# Patient Record
Sex: FEMALE | Race: BLACK OR AFRICAN AMERICAN | NOT HISPANIC OR LATINO | URBAN - METROPOLITAN AREA
[De-identification: names, ages, dates, MRNs, and addresses within clinical notes are randomized per-mention and may not be internally consistent; named-entity substitution may affect disease eponyms.]

---

## 2018-04-09 ENCOUNTER — OUTPATIENT (OUTPATIENT)
Dept: OUTPATIENT SERVICES | Facility: HOSPITAL | Age: 33
LOS: 1 days | Discharge: HOME | End: 2018-04-09

## 2018-04-10 DIAGNOSIS — Z02.1 ENCOUNTER FOR PRE-EMPLOYMENT EXAMINATION: ICD-10-CM

## 2019-02-04 ENCOUNTER — EMERGENCY (EMERGENCY)
Facility: HOSPITAL | Age: 34
LOS: 0 days | Discharge: HOME | End: 2019-02-05
Attending: EMERGENCY MEDICINE | Admitting: EMERGENCY MEDICINE

## 2019-02-04 VITALS
HEART RATE: 119 BPM | DIASTOLIC BLOOD PRESSURE: 85 MMHG | OXYGEN SATURATION: 98 % | RESPIRATION RATE: 20 BRPM | TEMPERATURE: 102 F | SYSTOLIC BLOOD PRESSURE: 157 MMHG

## 2019-02-04 DIAGNOSIS — Z91.013 ALLERGY TO SEAFOOD: ICD-10-CM

## 2019-02-04 DIAGNOSIS — R50.9 FEVER, UNSPECIFIED: ICD-10-CM

## 2019-02-04 DIAGNOSIS — N39.0 URINARY TRACT INFECTION, SITE NOT SPECIFIED: ICD-10-CM

## 2019-02-04 DIAGNOSIS — J11.1 INFLUENZA DUE TO UNIDENTIFIED INFLUENZA VIRUS WITH OTHER RESPIRATORY MANIFESTATIONS: ICD-10-CM

## 2019-02-04 DIAGNOSIS — J18.9 PNEUMONIA, UNSPECIFIED ORGANISM: ICD-10-CM

## 2019-02-04 NOTE — ED ADULT TRIAGE NOTE - CHIEF COMPLAINT QUOTE
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

## 2019-02-05 VITALS — TEMPERATURE: 99 F

## 2019-02-05 LAB
ALBUMIN SERPL ELPH-MCNC: 4.4 G/DL — SIGNIFICANT CHANGE UP (ref 3.5–5.2)
ALP SERPL-CCNC: 70 U/L — SIGNIFICANT CHANGE UP (ref 30–115)
ALT FLD-CCNC: 17 U/L — SIGNIFICANT CHANGE UP (ref 0–41)
ANION GAP SERPL CALC-SCNC: 17 MMOL/L — HIGH (ref 7–14)
APPEARANCE UR: ABNORMAL
APTT BLD: 32.4 SEC — SIGNIFICANT CHANGE UP (ref 27–39.2)
AST SERPL-CCNC: 17 U/L — SIGNIFICANT CHANGE UP (ref 0–41)
BACTERIA # UR AUTO: ABNORMAL /HPF
BASOPHILS # BLD AUTO: 0.05 K/UL — SIGNIFICANT CHANGE UP (ref 0–0.2)
BASOPHILS NFR BLD AUTO: 0.6 % — SIGNIFICANT CHANGE UP (ref 0–1)
BILIRUB SERPL-MCNC: 0.3 MG/DL — SIGNIFICANT CHANGE UP (ref 0.2–1.2)
BILIRUB UR-MCNC: NEGATIVE — SIGNIFICANT CHANGE UP
BUN SERPL-MCNC: 8 MG/DL — LOW (ref 10–20)
CALCIUM SERPL-MCNC: 9.1 MG/DL — SIGNIFICANT CHANGE UP (ref 8.5–10.1)
CHLORIDE SERPL-SCNC: 96 MMOL/L — LOW (ref 98–110)
CO2 SERPL-SCNC: 25 MMOL/L — SIGNIFICANT CHANGE UP (ref 17–32)
COLOR SPEC: YELLOW — SIGNIFICANT CHANGE UP
CREAT SERPL-MCNC: 0.8 MG/DL — SIGNIFICANT CHANGE UP (ref 0.7–1.5)
DIFF PNL FLD: NEGATIVE — SIGNIFICANT CHANGE UP
EOSINOPHIL # BLD AUTO: 0.1 K/UL — SIGNIFICANT CHANGE UP (ref 0–0.7)
EOSINOPHIL NFR BLD AUTO: 1.2 % — SIGNIFICANT CHANGE UP (ref 0–8)
GLUCOSE SERPL-MCNC: 120 MG/DL — HIGH (ref 70–99)
GLUCOSE UR QL: NEGATIVE MG/DL — SIGNIFICANT CHANGE UP
HCG SERPL QL: NEGATIVE — SIGNIFICANT CHANGE UP
HCT VFR BLD CALC: 34.4 % — LOW (ref 37–47)
HGB BLD-MCNC: 10.8 G/DL — LOW (ref 12–16)
IMM GRANULOCYTES NFR BLD AUTO: 0.6 % — HIGH (ref 0.1–0.3)
INR BLD: 1.18 RATIO — SIGNIFICANT CHANGE UP (ref 0.65–1.3)
KETONES UR-MCNC: NEGATIVE — SIGNIFICANT CHANGE UP
LACTATE SERPL-SCNC: 1.4 MMOL/L — SIGNIFICANT CHANGE UP (ref 0.5–2.2)
LEUKOCYTE ESTERASE UR-ACNC: NEGATIVE — SIGNIFICANT CHANGE UP
LYMPHOCYTES # BLD AUTO: 0.86 K/UL — LOW (ref 1.2–3.4)
LYMPHOCYTES # BLD AUTO: 10.6 % — LOW (ref 20.5–51.1)
MCHC RBC-ENTMCNC: 21.1 PG — LOW (ref 27–31)
MCHC RBC-ENTMCNC: 31.4 G/DL — LOW (ref 32–37)
MCV RBC AUTO: 67.1 FL — LOW (ref 81–99)
MONOCYTES # BLD AUTO: 1.09 K/UL — HIGH (ref 0.1–0.6)
MONOCYTES NFR BLD AUTO: 13.4 % — HIGH (ref 1.7–9.3)
NEUTROPHILS # BLD AUTO: 6 K/UL — SIGNIFICANT CHANGE UP (ref 1.4–6.5)
NEUTROPHILS NFR BLD AUTO: 73.6 % — SIGNIFICANT CHANGE UP (ref 42.2–75.2)
NITRITE UR-MCNC: POSITIVE
NRBC # BLD: 0 /100 WBCS — SIGNIFICANT CHANGE UP (ref 0–0)
PH UR: 7 — SIGNIFICANT CHANGE UP (ref 5–8)
PLATELET # BLD AUTO: 266 K/UL — SIGNIFICANT CHANGE UP (ref 130–400)
POTASSIUM SERPL-MCNC: 3.8 MMOL/L — SIGNIFICANT CHANGE UP (ref 3.5–5)
POTASSIUM SERPL-SCNC: 3.8 MMOL/L — SIGNIFICANT CHANGE UP (ref 3.5–5)
PROT SERPL-MCNC: 7.4 G/DL — SIGNIFICANT CHANGE UP (ref 6–8)
PROT UR-MCNC: NEGATIVE MG/DL — SIGNIFICANT CHANGE UP
PROTHROM AB SERPL-ACNC: 13.5 SEC — HIGH (ref 9.95–12.87)
RBC # BLD: 5.13 M/UL — SIGNIFICANT CHANGE UP (ref 4.2–5.4)
RBC # FLD: 15.1 % — HIGH (ref 11.5–14.5)
SODIUM SERPL-SCNC: 138 MMOL/L — SIGNIFICANT CHANGE UP (ref 135–146)
SP GR SPEC: 1.01 — SIGNIFICANT CHANGE UP (ref 1.01–1.03)
UROBILINOGEN FLD QL: 1 MG/DL (ref 0.2–0.2)
WBC # BLD: 8.15 K/UL — SIGNIFICANT CHANGE UP (ref 4.8–10.8)
WBC # FLD AUTO: 8.15 K/UL — SIGNIFICANT CHANGE UP (ref 4.8–10.8)

## 2019-02-05 RX ORDER — SODIUM CHLORIDE 9 MG/ML
3000 INJECTION INTRAMUSCULAR; INTRAVENOUS; SUBCUTANEOUS ONCE
Qty: 0 | Refills: 0 | Status: COMPLETED | OUTPATIENT
Start: 2019-02-05 | End: 2019-02-05

## 2019-02-05 RX ORDER — AZITHROMYCIN 500 MG/1
500 TABLET, FILM COATED ORAL ONCE
Qty: 0 | Refills: 0 | Status: COMPLETED | OUTPATIENT
Start: 2019-02-05 | End: 2019-02-05

## 2019-02-05 RX ORDER — CIPROFLOXACIN LACTATE 400MG/40ML
1 VIAL (ML) INTRAVENOUS
Qty: 5 | Refills: 0 | OUTPATIENT
Start: 2019-02-05 | End: 2019-02-09

## 2019-02-05 RX ORDER — CEFTRIAXONE 500 MG/1
1 INJECTION, POWDER, FOR SOLUTION INTRAMUSCULAR; INTRAVENOUS ONCE
Qty: 0 | Refills: 0 | Status: COMPLETED | OUTPATIENT
Start: 2019-02-05 | End: 2019-02-05

## 2019-02-05 RX ORDER — KETOROLAC TROMETHAMINE 30 MG/ML
15 SYRINGE (ML) INJECTION ONCE
Qty: 0 | Refills: 0 | Status: DISCONTINUED | OUTPATIENT
Start: 2019-02-05 | End: 2019-02-05

## 2019-02-05 RX ADMIN — SODIUM CHLORIDE 3000 MILLILITER(S): 9 INJECTION INTRAMUSCULAR; INTRAVENOUS; SUBCUTANEOUS at 01:06

## 2019-02-05 RX ADMIN — Medication 15 MILLIGRAM(S): at 01:06

## 2019-02-05 RX ADMIN — AZITHROMYCIN 500 MILLIGRAM(S): 500 TABLET, FILM COATED ORAL at 04:00

## 2019-02-05 RX ADMIN — CEFTRIAXONE 100 GRAM(S): 500 INJECTION, POWDER, FOR SOLUTION INTRAMUSCULAR; INTRAVENOUS at 03:33

## 2019-02-05 NOTE — ED PROVIDER NOTE - CARE PLAN
Principal Discharge DX:	Flu Principal Discharge DX:	Flu  Secondary Diagnosis:	Pneumonia  Secondary Diagnosis:	UTI (urinary tract infection)

## 2019-02-05 NOTE — ED PROVIDER NOTE - PROGRESS NOTE DETAILS
I reassessed the patient, reviewed vital signs. <2 sec capillary refill, 2+ radial pulse, skin warm and dry with no tenting.

## 2019-02-05 NOTE — ED PROVIDER NOTE - PHYSICAL EXAMINATION
Febrile, tachycardic, hemodynamically stable, saturating well on RA  NAD, well appearing  Head NCAT  EOMI grossly, anicteric  Neck supple, full ROM  MMM  RRR, nml S1/S2, no m/r/g  Lungs CTAB, no w/r/r  Abd soft, NT, ND, nml BS, no rebound or guarding  AAO, CN's 3-12 grossly intact  WARE spontaneously, no leg cyanosis or edema  Skin warm, well perfused, no rashes or hives

## 2019-02-05 NOTE — ED PROVIDER NOTE - MEDICAL DECISION MAKING DETAILS
No meningeal signs, signs of endocarditis or cellulitis, or e/o UTI or PNA on testing. No meningeal signs, signs of endocarditis or cellulitis. Noted cough and concern for PNA on CXR as well as positive UA for UTI though no symptoms, no signs on exam of pyelo. Given ceftriaxone/azith as well as fluids with resolution of symptoms. Pt well appearing, well hydrated, hemodynamically stable. Discharged on levaquin for both UTI/PNA, tamiflu, need for hydration, further symptomatic care, return precautions.

## 2019-02-10 LAB
CULTURE RESULTS: SIGNIFICANT CHANGE UP
CULTURE RESULTS: SIGNIFICANT CHANGE UP
SPECIMEN SOURCE: SIGNIFICANT CHANGE UP
SPECIMEN SOURCE: SIGNIFICANT CHANGE UP

## 2019-07-23 ENCOUNTER — EMERGENCY (EMERGENCY)
Facility: HOSPITAL | Age: 34
LOS: 0 days | Discharge: HOME | End: 2019-07-23
Admitting: EMERGENCY MEDICINE
Payer: OTHER MISCELLANEOUS

## 2019-07-23 VITALS
HEART RATE: 83 BPM | TEMPERATURE: 96 F | SYSTOLIC BLOOD PRESSURE: 139 MMHG | RESPIRATION RATE: 16 BRPM | OXYGEN SATURATION: 97 % | DIASTOLIC BLOOD PRESSURE: 90 MMHG

## 2019-07-23 DIAGNOSIS — Z79.899 OTHER LONG TERM (CURRENT) DRUG THERAPY: ICD-10-CM
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
## 2019-07-23 DIAGNOSIS — M25.532 PAIN IN LEFT WRIST: ICD-10-CM

## 2019-07-23 PROCEDURE — 99283 EMERGENCY DEPT VISIT LOW MDM: CPT | Mod: 25

## 2019-07-23 PROCEDURE — 29125 APPL SHORT ARM SPLINT STATIC: CPT

## 2019-07-23 PROCEDURE — 99053 MED SERV 10PM-8AM 24 HR FAC: CPT

## 2019-07-23 PROCEDURE — 73110 X-RAY EXAM OF WRIST: CPT | Mod: 26,LT

## 2019-07-23 NOTE — ED PROVIDER NOTE - OBJECTIVE STATEMENT
34 yo female with no significant pmh presents to the ED c/o left wrist pain s/p twisting her wrist yesterday while at work. Patient states she is a  and was helping lifting a pain into a hoist and twister her wrist. No additional injury. No head trauma. Denies fever, UE weakness or paresthesias. no elbow, hand or shoulder pain.

## 2019-07-23 NOTE — ED ADULT NURSE NOTE - CHIEF COMPLAINT
Atorvastatin sent to [facility] pharmacy. Not sure what form he wants for heart failure. Need more details from pt.    The patient is a 33y Female complaining of wrist pain/injury.

## 2019-07-23 NOTE — ED PROVIDER NOTE - NS ED ROS FT
Constitutional: no fever, chills   Cardiovascular: no chest pain, no sob  Respiratory: no cough, no shortness of breath  Gastrointestinal: no nausea, vomiting  Musculoskeletal: see hpi.  no neck pain, no back pain, no joint pain.    Integumentary: no rash or skin changes. no edema  Neurological: no headache, no dizziness, no visual changes, no UE/LE weakness or paresthesias.

## 2019-07-23 NOTE — ED PROVIDER NOTE - CARE PROVIDER_API CALL
Ac Enriquez)  Orthopaedic Surgery  [address] [place] [address]  Phone: [phone]  Fax: [phone]  Follow Up Time:

## 2019-07-23 NOTE — ED PROVIDER NOTE - PHYSICAL EXAMINATION
GENERAL:  well appearing, non-toxic female in no acute distress  SKIN: skin warm, pink and dry. MMM. + mild swelling to radial aspect of left wrist. no erythema, increased warmth, rash, abrasions/lacerations. cap refill < 2 sec   MSK: + TTP radial aspect of left wrist but with FROM. no snuffbox tenderness. no TTP metacarpals or elbow with FROM. No erythema, cyanosis. radial pulses equal and intact bilaterally  NEURO: A+Ox3. Sensation of bilateral UE equal and intact bilaterally.

## 2019-09-19 ENCOUNTER — EMERGENCY (EMERGENCY)
Facility: HOSPITAL | Age: 34
LOS: 0 days | Discharge: HOME | End: 2019-09-19
Admitting: EMERGENCY MEDICINE
Payer: COMMERCIAL

## 2019-09-19 VITALS
TEMPERATURE: 98 F | SYSTOLIC BLOOD PRESSURE: 119 MMHG | HEART RATE: 86 BPM | OXYGEN SATURATION: 97 % | DIASTOLIC BLOOD PRESSURE: 82 MMHG | RESPIRATION RATE: 18 BRPM

## 2019-09-19 DIAGNOSIS — R05 COUGH: ICD-10-CM

## 2019-09-19 PROCEDURE — 99283 EMERGENCY DEPT VISIT LOW MDM: CPT

## 2019-09-19 PROCEDURE — 71046 X-RAY EXAM CHEST 2 VIEWS: CPT | Mod: 26

## 2019-09-19 RX ORDER — AZITHROMYCIN 500 MG/1
1 TABLET, FILM COATED ORAL
Qty: 4 | Refills: 0
Start: 2019-09-19 | End: 2019-09-21

## 2019-09-19 NOTE — ED PROVIDER NOTE - OBJECTIVE STATEMENT
34 year old F c/o broken L 4th finger. Pt sts she noticed the nail was broken yesterday at work but denies any known injuries/trauma. No pain/paresthesias, decreased sensation. Pt also notes dry cough x 1 week. Cough is worse at night. + rhinorrhea, sore throat. Sts multiple sick contacts at work. No fever/chills, nausea, vomiting, chest pain, sob, recent travel.

## 2019-09-19 NOTE — ED PROVIDER NOTE - PATIENT PORTAL LINK FT
You can access the FollowMyHealth Patient Portal offered by [facility] by registering at the following website: [email or web address]. By joining [employer]’s FollowMyHealth portal, you will also be able to view your health information using other applications (apps) compatible with our system.

## 2019-09-19 NOTE — ED PROVIDER NOTE - PHYSICAL EXAMINATION
CONSTITUTIONAL: Well-appearing; well-nourished; in no apparent distress.   EYES: PERRL; EOM intact.   ENT: normal nose; no rhinorrhea; normal pharynx with no tonsillar hypertrophy.   NECK: Supple; non-tender; no cervical lymphadenopathy.   CARDIOVASCULAR: Normal S1, S2; no murmurs, rubs, or gallops.   RESPIRATORY: Normal chest excursion with respiration; breath sounds clear and equal bilaterally; no wheezes, rhonchi, or rales.  MS: No evidence of trauma or deformity. Normal ROM in all four extremities; non-tender to palpation; distal pulses are normal.   SKIN: + left 4th finger nail partially removed. Nail bed intact. No other injuries, cellulitic changes, discharge  NEURO/PSYCH: A & O x 4; grossly unremarkable. Sensation intact

## 2019-09-20 PROBLEM — Z78.9 OTHER SPECIFIED HEALTH STATUS: Chronic | Status: ACTIVE | Noted: 2019-07-23

## 2021-08-13 ENCOUNTER — EMERGENCY (EMERGENCY)
Facility: HOSPITAL | Age: 36
LOS: 0 days | Discharge: HOME | End: 2021-08-13
Attending: EMERGENCY MEDICINE | Admitting: EMERGENCY MEDICINE
Payer: COMMERCIAL

## 2021-08-13 VITALS
SYSTOLIC BLOOD PRESSURE: 104 MMHG | DIASTOLIC BLOOD PRESSURE: 51 MMHG | WEIGHT: 293 LBS | TEMPERATURE: 99 F | RESPIRATION RATE: 20 BRPM | HEART RATE: 100 BPM | HEIGHT: 64 IN

## 2021-08-13 DIAGNOSIS — M79.10 MYALGIA, UNSPECIFIED SITE: ICD-10-CM

## 2021-08-13 DIAGNOSIS — Z20.822 CONTACT WITH AND (SUSPECTED) EXPOSURE TO COVID-19: ICD-10-CM

## 2021-08-13 DIAGNOSIS — E66.9 OBESITY, UNSPECIFIED: ICD-10-CM

## 2021-08-13 DIAGNOSIS — Z91.013 ALLERGY TO SEAFOOD: ICD-10-CM

## 2021-08-13 DIAGNOSIS — J02.9 ACUTE PHARYNGITIS, UNSPECIFIED: ICD-10-CM

## 2021-08-13 DIAGNOSIS — Z86.16 PERSONAL HISTORY OF COVID-19: ICD-10-CM

## 2021-08-13 DIAGNOSIS — R05 COUGH: ICD-10-CM

## 2021-08-13 DIAGNOSIS — R50.9 FEVER, UNSPECIFIED: ICD-10-CM

## 2021-08-13 DIAGNOSIS — Z79.899 OTHER LONG TERM (CURRENT) DRUG THERAPY: ICD-10-CM

## 2021-08-13 DIAGNOSIS — R09.81 NASAL CONGESTION: ICD-10-CM

## 2021-08-13 LAB — SARS-COV-2 RNA SPEC QL NAA+PROBE: SIGNIFICANT CHANGE UP

## 2021-08-13 PROCEDURE — 99284 EMERGENCY DEPT VISIT MOD MDM: CPT

## 2021-08-13 RX ORDER — IBUPROFEN 200 MG
800 TABLET ORAL ONCE
Refills: 0 | Status: COMPLETED | OUTPATIENT
Start: 2021-08-13 | End: 2021-08-13

## 2021-08-13 RX ADMIN — Medication 800 MILLIGRAM(S): at 21:39

## 2021-08-13 NOTE — ED PROVIDER NOTE - NSFOLLOWUPINSTRUCTIONS_ED_ALL_ED_FT
Follow up with your primary medical doctor in 1-2 days     Sore Throat  A sore throat is pain, burning, irritation, or scratchiness in the throat. When you have a sore throat, you may feel pain or tenderness in your throat when you swallow or talk.    Many things can cause a sore throat, including:  An infection.  Seasonal allergies.  Dryness in the air.  Irritants, such as smoke or pollution.  Gastroesophageal reflux disease (GERD).  A tumor.  A sore throat is often the first sign of another sickness. It may happen with other symptoms, such as coughing, sneezing, fever, and swollen neck glands. Most sore throats go away without medical treatment.    Follow these instructions at home:  Image Image   Take over-the-counter medicines only as told by your health care provider.  Drink enough fluids to keep your urine clear or pale yellow.  Rest as needed.  To help with pain, try:  Sipping warm liquids, such as broth, herbal tea, or warm water.  Eating or drinking cold or frozen liquids, such as frozen ice pops.  Gargling with a salt-water mixture 3–4 times a day or as needed. To make a salt-water mixture, completely dissolve ½–1 tsp of salt in 1 cup of warm water.  Sucking on hard candy or throat lozenges.  Putting a cool-mist humidifier in your bedroom at night to moisten the air.  Sitting in the bathroom with the door closed for 5–10 minutes while you run hot water in the shower.  Do not use any tobacco products, such as cigarettes, chewing tobacco, and e-cigarettes. If you need help quitting, ask your health care provider.  Contact a health care provider if:  You have a fever for more than 2–3 days.  You have symptoms that last (are persistent) for more than 2–3 days.  Your throat does not get better within 7 days.  You have a fever and your symptoms suddenly get worse.  Get help right away if:  You have difficulty breathing.  You cannot swallow fluids, soft foods, or your saliva.  You have increased swelling in your throat or neck.  You have persistent nausea and vomiting.  This information is not intended to replace advice given to you by your health care provider. Make sure you discuss any questions you have with your health care provider.

## 2021-08-13 NOTE — ED PROVIDER NOTE - PHYSICAL EXAMINATION
Physical Exam    Vital Signs: I have reviewed the initial vital signs.  Constitutional: well-nourished, appears stated age, no acute distress  Eyes: Conjunctiva pink, Sclera clear, PERRLA, EOMI.  Throat: uvula midline, no tonsillar erythema or swelling, no PTA, no exudates   Cardiovascular: S1 and S2, regular rate, regular rhythm, well-perfused extremities, radial pulses equal and 2+  Respiratory: unlabored respiratory effort, clear to auscultation bilaterally no wheezing, rales and rhonchi  Gastrointestinal: soft, non-tender abdomen, no pulsatile mass, normal bowl sounds  Musculoskeletal: supple neck, no lower extremity edema, no midline tenderness  Integumentary: warm, dry, no rash  Neurologic: awake, alert, cranial nerves II-XII grossly intact, extremities’ motor and sensory functions grossly intact  Psychiatric: appropriate mood, appropriate affect

## 2021-08-13 NOTE — ED PROVIDER NOTE - OBJECTIVE STATEMENT
Pt is a 36 year old female with PMH obesity and COVID, presents to ED with complaints cough. Pt states for past 1-2 days has been having dry cough, non productive. also been having sore throat with body aches. pain is mild, non radiating with no alleviating or aggravating factors. Pt states came in to make sure she did not have covid or strep. Pt denies any fever, chills, bdoyaches, chest pain, sob, abdominal pain, NVCD, no vaccinated for COVID

## 2021-08-13 NOTE — ED ADULT TRIAGE NOTE - SOURCE OF INFORMATION
----- Message from Chester Bernard sent at 6/5/2018  9:49 AM CDT -----  Contact: Pt   Will like a call from Dr. Whatley in regards to potential clinical trail on adoptive cell therapy     under Dr. Alessio Wray at [facility]      Contact::[phone]   Patient

## 2021-08-13 NOTE — ED PROVIDER NOTE - NS ED ROS FT
Constitutional: (-) fever  Eyes/ENT: (-) blurry vision, (-) epistaxis (+) sore throat  Cardiovascular: (-) chest pain, (-) syncope  Respiratory: (+) cough, (-) shortness of breath  Gastrointestinal: (-) vomiting, (-) diarrhea  Musculoskeletal: (-) neck pain, (-) back pain, (-) joint pain  Integumentary: (-) rash, (-) edema  Neurological: (-) headache, (-) altered mental status  Psychiatric: (-) hallucinations  Allergic/Immunologic: (-) pruritus

## 2021-08-13 NOTE — ED PROVIDER NOTE - CLINICAL SUMMARY MEDICAL DECISION MAKING FREE TEXT BOX
37yo F presenting with fevers/chills cough congestion myalgias sore throat. no exac/relieving sx. Well appearing, NAD, non toxic. NCAT PERRLA EOMI neck supple non tender normal wob  WWPx4 neuro non focal. normal ambulatory o2 sat. Comfortable with discharge and follow-up outpatient, strict return precautions given. Endorses understanding of all of this and aware that they can return at any time for new or concerning symptoms. No further questions or concerns at this time

## 2021-08-13 NOTE — ED ADULT NURSE NOTE - HIV OFFER
follow up on:  complex medical decision making related to goals of care    OVERNIGHT EVENTS:    Present Symptoms:   Pain:  Fatigue:  Nausea:  Lack of Appetite:   SOB:  Depression:  Anxiety:  Review of Systems: [All others negative or Unable to obtain due to poor mentation]    MEDICATIONS  (STANDING):  dextrose 5% + sodium chloride 0.45%. 1000 milliLiter(s) (75 mL/Hr) IV Continuous <Continuous>  insulin lispro (ADMELOG) corrective regimen sliding scale   SubCutaneous at bedtime  morphine Concentrate 2.5 milliGRAM(s) Oral once  pantoprazole  Injectable 40 milliGRAM(s) IV Push daily  predniSONE   Tablet   Oral   predniSONE   Tablet 40 milliGRAM(s) Oral daily    MEDICATIONS  (PRN):  metoprolol tartrate Injectable 2.5 milliGRAM(s) IV Push every 6 hours PRN give for SBP >/ = 140      PHYSICAL EXAM:  Vital Signs Last 24 Hrs  T(C): 36.1 (30 Apr 2021 05:00), Max: 36.7 (29 Apr 2021 10:47)  T(F): 97 (30 Apr 2021 05:00), Max: 98 (29 Apr 2021 10:47)  HR: 100 (30 Apr 2021 05:00) (92 - 100)  BP: 155/82 (30 Apr 2021 05:00) (129/80 - 155/82)  BP(mean): --  RR: 20 (30 Apr 2021 05:00) (20 - 20)  SpO2: 92% (30 Apr 2021 05:00) (92% - 100%)    General: alert  oriented x ____    lethargic distressed cachexia  verbal nonverbal  unarousable     Palliative Performance Scale/Karnofsky Score:  ECOG Performance:    HEENT: no abnormal lesions dry mouth  ET tube/trach oral lesions:  Lungs: comfortable tachypnea/labored breathing  excessive secretions  CV: RRR, S1S2, tachycardia  GI: soft non distended non tender  incontinent               PEG/NG/OG tube  constipation  last BM:   : incontinent  oliguria/anuria  unger  Musculoskeletal: weakness  edema   ambulatory with assistance   bedbound/wheelchair bound  Skin: no skin lesions, poor skin turgor, pressure ulcer stage:   Neuro: no deficits, cognitive impairment dsyphagia/dysarthria paresis  Oral intake ability: unable/only mouth care minimal moderate full capability    LABS:                          10.7   15.08 )-----------( 222      ( 30 Apr 2021 09:40 )             33.2     04-30    149<H>  |  119<H>  |  52<H>  ----------------------------<  181<H>  4.5   |  17<L>  |  1.38<H>    Ca    9.2      30 Apr 2021 09:40  Phos  3.8     04-30    TPro  6.1  /  Alb  2.0<L>  /  TBili  1.1  /  DBili  x   /  AST  x   /  ALT  19  /  AlkPhos  125<H>  04-30        RADIOLOGY & ADDITIONAL STUDIES: follow up on:  complex medical decision making related to goals of care    OVERNIGHT EVENTS: s/p two RRTs last night and this morning    Present Symptoms:   Pain: on chest and back   Fatigue: severe  Nausea: none  Lack of Appetite: severe  SOB: severe  Depression: unable to assess  Anxiety: severe  Review of Systems: [All others negative     MEDICATIONS  (STANDING):  dextrose 5% + sodium chloride 0.45%. 1000 milliLiter(s) (75 mL/Hr) IV Continuous <Continuous>  insulin lispro (ADMELOG) corrective regimen sliding scale   SubCutaneous at bedtime  morphine Concentrate 2.5 milliGRAM(s) Oral once  pantoprazole  Injectable 40 milliGRAM(s) IV Push daily  predniSONE   Tablet   Oral   predniSONE   Tablet 40 milliGRAM(s) Oral daily    MEDICATIONS  (PRN):  metoprolol tartrate Injectable 2.5 milliGRAM(s) IV Push every 6 hours PRN give for SBP >/ = 140      PHYSICAL EXAM:  Vital Signs Last 24 Hrs  T(C): 36.1 (30 Apr 2021 05:00), Max: 36.7 (29 Apr 2021 10:47)  T(F): 97 (30 Apr 2021 05:00), Max: 98 (29 Apr 2021 10:47)  HR: 100 (30 Apr 2021 05:00) (92 - 100)  BP: 155/82 (30 Apr 2021 05:00) (129/80 - 155/82)  BP(mean): --  RR: 20 (30 Apr 2021 05:00) (20 - 20)  SpO2: 92% (30 Apr 2021 05:00) (92% - 100%)    General: alert  oriented x 1   lethargic distressed verbal on simple questions    Palliative Performance Scale/Karnofsky Score: 30%  HEENT: no abnormal lesions dry mouth on NRB, no dentures  Lungs: moderate to severe tachypnea/labored breathing   CV: RR, S1S2, tachycardia  GI: soft non distended non tender  incontinent  : incontinent  oliguria unger  Musculoskeletal: weakness  edema  x4 extensive subcutaneous crepitous emphysema  Skin: no skin lesions, poor skin turgor    Neuro: moderate to severe cognitive impairment dsyphagia  Oral intake ability: unable/only mouth care     LABS:                        10.7   15.08 )-----------( 222      ( 30 Apr 2021 09:40 )             33.2     04-30    149<H>  |  119<H>  |  52<H>  ----------------------------<  181<H>  4.5   |  17<L>  |  1.38<H>    Ca    9.2      30 Apr 2021 09:40  Phos  3.8     04-30    TPro  6.1  /  Alb  2.0<L>  /  TBili  1.1  /  DBili  x   /  AST  x   /  ALT  19  /  AlkPhos  125<H>  04-30        RADIOLOGY & ADDITIONAL STUDIES: reviewed Opt out

## 2021-08-13 NOTE — ED PROVIDER NOTE - PATIENT PORTAL LINK FT
You can access the FollowMyHealth Patient Portal offered by [facility] by registering at the following website: [email or web address]. By joining [employer]’s FollowMyHealth portal, you will also be able to view your health information using other applications (apps) compatible with our system.

## 2022-09-16 ENCOUNTER — EMERGENCY (EMERGENCY)
Facility: HOSPITAL | Age: 37
LOS: 0 days | Discharge: HOME | End: 2022-09-17
Attending: STUDENT IN AN ORGANIZED HEALTH CARE EDUCATION/TRAINING PROGRAM | Admitting: STUDENT IN AN ORGANIZED HEALTH CARE EDUCATION/TRAINING PROGRAM

## 2022-09-16 VITALS
TEMPERATURE: 98 F | RESPIRATION RATE: 18 BRPM | OXYGEN SATURATION: 99 % | DIASTOLIC BLOOD PRESSURE: 70 MMHG | HEART RATE: 102 BPM | HEIGHT: 64 IN | SYSTOLIC BLOOD PRESSURE: 138 MMHG

## 2022-09-16 VITALS
RESPIRATION RATE: 19 BRPM | OXYGEN SATURATION: 98 % | DIASTOLIC BLOOD PRESSURE: 88 MMHG | SYSTOLIC BLOOD PRESSURE: 161 MMHG | TEMPERATURE: 99 F | HEART RATE: 76 BPM

## 2022-09-16 DIAGNOSIS — R50.9 FEVER, UNSPECIFIED: ICD-10-CM

## 2022-09-16 DIAGNOSIS — Z91.013 ALLERGY TO SEAFOOD: ICD-10-CM

## 2022-09-16 DIAGNOSIS — J02.9 ACUTE PHARYNGITIS, UNSPECIFIED: ICD-10-CM

## 2022-09-16 LAB
ALBUMIN SERPL ELPH-MCNC: 4.3 G/DL — SIGNIFICANT CHANGE UP (ref 3.5–5.2)
ALP SERPL-CCNC: 77 U/L — SIGNIFICANT CHANGE UP (ref 30–115)
ALT FLD-CCNC: 15 U/L — SIGNIFICANT CHANGE UP (ref 0–41)
ANION GAP SERPL CALC-SCNC: 8 MMOL/L — SIGNIFICANT CHANGE UP (ref 7–14)
AST SERPL-CCNC: 15 U/L — SIGNIFICANT CHANGE UP (ref 0–41)
BASOPHILS # BLD AUTO: 0.05 K/UL — SIGNIFICANT CHANGE UP (ref 0–0.2)
BASOPHILS NFR BLD AUTO: 0.4 % — SIGNIFICANT CHANGE UP (ref 0–1)
BILIRUB SERPL-MCNC: 0.3 MG/DL — SIGNIFICANT CHANGE UP (ref 0.2–1.2)
BUN SERPL-MCNC: 6 MG/DL — LOW (ref 10–20)
CALCIUM SERPL-MCNC: 9.4 MG/DL — SIGNIFICANT CHANGE UP (ref 8.4–10.5)
CHLORIDE SERPL-SCNC: 100 MMOL/L — SIGNIFICANT CHANGE UP (ref 98–110)
CO2 SERPL-SCNC: 30 MMOL/L — SIGNIFICANT CHANGE UP (ref 17–32)
CREAT SERPL-MCNC: 0.8 MG/DL — SIGNIFICANT CHANGE UP (ref 0.7–1.5)
EGFR: 98 ML/MIN/1.73M2 — SIGNIFICANT CHANGE UP
EOSINOPHIL # BLD AUTO: 0.21 K/UL — SIGNIFICANT CHANGE UP (ref 0–0.7)
EOSINOPHIL NFR BLD AUTO: 1.8 % — SIGNIFICANT CHANGE UP (ref 0–8)
GLUCOSE SERPL-MCNC: 133 MG/DL — HIGH (ref 70–99)
HCG SERPL QL: NEGATIVE — SIGNIFICANT CHANGE UP
HCT VFR BLD CALC: 36.6 % — LOW (ref 37–47)
HGB BLD-MCNC: 11.3 G/DL — LOW (ref 12–16)
IMM GRANULOCYTES NFR BLD AUTO: 0.8 % — HIGH (ref 0.1–0.3)
LYMPHOCYTES # BLD AUTO: 1.9 K/UL — SIGNIFICANT CHANGE UP (ref 1.2–3.4)
LYMPHOCYTES # BLD AUTO: 16.4 % — LOW (ref 20.5–51.1)
MCHC RBC-ENTMCNC: 21.2 PG — LOW (ref 27–31)
MCHC RBC-ENTMCNC: 30.9 G/DL — LOW (ref 32–37)
MCV RBC AUTO: 68.7 FL — LOW (ref 81–99)
MONOCYTES # BLD AUTO: 0.95 K/UL — HIGH (ref 0.1–0.6)
MONOCYTES NFR BLD AUTO: 8.2 % — SIGNIFICANT CHANGE UP (ref 1.7–9.3)
NEUTROPHILS # BLD AUTO: 8.41 K/UL — HIGH (ref 1.4–6.5)
NEUTROPHILS NFR BLD AUTO: 72.4 % — SIGNIFICANT CHANGE UP (ref 42.2–75.2)
NRBC # BLD: 0 /100 WBCS — SIGNIFICANT CHANGE UP (ref 0–0)
PLATELET # BLD AUTO: 311 K/UL — SIGNIFICANT CHANGE UP (ref 130–400)
POTASSIUM SERPL-MCNC: 4.1 MMOL/L — SIGNIFICANT CHANGE UP (ref 3.5–5)
POTASSIUM SERPL-SCNC: 4.1 MMOL/L — SIGNIFICANT CHANGE UP (ref 3.5–5)
PROT SERPL-MCNC: 7.2 G/DL — SIGNIFICANT CHANGE UP (ref 6–8)
RBC # BLD: 5.33 M/UL — SIGNIFICANT CHANGE UP (ref 4.2–5.4)
RBC # FLD: 15.2 % — HIGH (ref 11.5–14.5)
SODIUM SERPL-SCNC: 138 MMOL/L — SIGNIFICANT CHANGE UP (ref 135–146)
WBC # BLD: 11.61 K/UL — HIGH (ref 4.8–10.8)
WBC # FLD AUTO: 11.61 K/UL — HIGH (ref 4.8–10.8)

## 2022-09-16 PROCEDURE — 70491 CT SOFT TISSUE NECK W/DYE: CPT | Mod: 26,MA

## 2022-09-16 PROCEDURE — 99285 EMERGENCY DEPT VISIT HI MDM: CPT

## 2022-09-16 RX ORDER — ACETAMINOPHEN 500 MG
650 TABLET ORAL ONCE
Refills: 0 | Status: COMPLETED | OUTPATIENT
Start: 2022-09-16 | End: 2022-09-16

## 2022-09-16 RX ORDER — DEXAMETHASONE 0.5 MG/5ML
10 ELIXIR ORAL ONCE
Refills: 0 | Status: COMPLETED | OUTPATIENT
Start: 2022-09-16 | End: 2022-09-16

## 2022-09-16 RX ADMIN — Medication 650 MILLIGRAM(S): at 21:52

## 2022-09-16 RX ADMIN — Medication 10 MILLIGRAM(S): at 21:49

## 2022-09-16 NOTE — ED ADULT NURSE NOTE - OBJECTIVE STATEMENT
pt presented to ED c/o sore throat x 2 days. AIrway patent No resp distress noted on assessment. AOx4

## 2022-09-16 NOTE — ED PROVIDER NOTE - OBJECTIVE STATEMENT
37 year old female, no past medical history, who presents with sore throat x2 days. patient with sore throat that progressively worsened with associated subjective fever. denies headache, neck pain, cough, nasal congestion, nausea/vomiting. +sick contacts, no recent travel. patient vaccinated for COVID.

## 2022-09-16 NOTE — ED PROVIDER NOTE - CARE PLAN
Assessment and plan of treatment:	r/o deep soft tissue infection  plan for labs, imaging,   trial of decadron.   1 Principal Discharge DX:	Sore throat  Assessment and plan of treatment:	r/o deep soft tissue infection  plan for labs, imaging,   trial of decadron.

## 2022-09-16 NOTE — ED PROVIDER NOTE - PHYSICAL EXAMINATION
CONSTITUTIONAL: Well-developed; well-nourished; in no acute distress, nontoxic appearing  SKIN: skin exam is warm and dry  EYES: PERRL, conjunctiva and sclera clear.  ENT: erythematous oropharynx, uvula midline, tolerating secretions, +muffled voice, no drooling. no exudates   NECK: ROM intact, no nuchal rigidity.   CARD: S1, S2 normal, no murmur  RESP: Good air movement bilaterally  EXT: Normal ROM.   NEURO: awake, alert, following commands, oriented, grossly unremarkable. No Focal deficits. GCS 15.   PSYCH: Cooperative, appropriate.

## 2022-09-16 NOTE — ED PROVIDER NOTE - PATIENT PORTAL LINK FT
You can access the FollowMyHealth Patient Portal offered by [facility] by registering at the following website: [email or web address]. By joining [employer]’s FollowMyHealth portal, you will also be able to view your health information using other applications (apps) compatible with our system.

## 2022-09-16 NOTE — ED PROVIDER NOTE - NS ED ROS FT
Review of Systems:  	•	CONSTITUTIONAL: no fever  	•	SKIN: no rash  	•	EYES: no eye pain, no blurry vision  	•	ENT: +sore throat, no drooling, no ear pain   	•	RESPIRATORY: no shortness of breath, no cough  	•	GI: no abd pain, no nausea, no vomiting  	•	MUSCULOSKELETAL: no joint paint, no swelling, no redness  	•	NEUROLOGIC: no weakness, no headache  	•	PSYCH: no anxiety, non suicidal, non homicidal, no hallucination, no depression

## 2022-09-16 NOTE — ED PROVIDER NOTE - NSFOLLOWUPINSTRUCTIONS_ED_ALL_ED_FT
Take ibuprofen 400-600mg every 4-6 hours as needed for pain. Stay well hydrated, drink tea with honey and gargle with warm salt water. Follow up with primary care doctor in 2-4 days for reassessment. Return for worsening symptoms, inability to stay hydrated, fever, neck pain, or other concerning symptoms.    Pharyngitis    Pharyngitis is inflammation of your pharynx, which is typically caused by a viral or bacterial infection. Pharyngitis can be contagious and may spread from person to person through intimate contact, coughing, sneezing, or sharing personal items and utensils. Symptoms of pharyngitis may include sore throat, fever, headache, or swollen lymph nodes. If you are prescribed antibiotics, make sure you finish them even if you start to feel better. Gargle with salt water as often as every 1-2 hours to soothe your throat. Throat lozenges (if you are not at risk for choking) or sprays may be used to soothe your throat.    SEEK IMMEDIATE MEDICAL CARE IF YOU HAVE ANY OF THE FOLLOWING SYMPTOMS: neck stiffness, drooling, hoarseness or change in voice, inability to swallow liquids, vomiting, or trouble breathing.

## 2022-09-16 NOTE — ED PROVIDER NOTE - CLINICAL SUMMARY MEDICAL DECISION MAKING FREE TEXT BOX
co- pt reassessed after signout, pt feeling much better. labs/imaging reviewed. no e/o bacterial pharyngitis or deep space infection.  likely viral given concurrent uri sx. will dc and rec home care measures, OP f/u, return precautions

## 2022-09-16 NOTE — ED PROVIDER NOTE - ATTENDING APP SHARED VISIT CONTRIBUTION OF CARE
37-year-old female with no relevant past medical history presenting with sore throat x4 days.  Patient endorses progressive pain, odynophagia.  Endorses chills.  Denies any significance sick contacts.  Denies any cough, shortness of breath, chest pain.    Patient appears uncomfortable on exam, appears to have hoarseness to her voice.  No significant tonsillar edema or exudates on exam.  No significant induration to the anterior lateral neck.  Patient is tender to palpation of the anterior neck.  Tolerating secretions.  Floor of mouth is soft nontender not raised.  No temporal abscesses or significant caries noted.  Lungs clear to auscultation bilaterally.  Regular rhythm.

## 2022-09-16 NOTE — ED ADULT NURSE NOTE - NSFALLRSKOUTCOME_ED_ALL_ED
Pt returned clinic call.  She states that for past month, she has been experiencing nausea, dizziness with position changes, and fatigue.  She denies any major diet changes or changes to medication. She does not check her blood pressure.  States that she has tried to eat a bland diet, but notes that she is nauseous even from drinking water at times.  No history of similar symptoms.  Feels like it getting worse because it happens more frequently. When it first started, the nausea would go away for part of the day. Now, it seems to last the entire day.  Has taken multiple pregnancy tests, most recent test was in the beginning of November. Her menstrual cycle started next day.   She is fatigued, no matter how much she sleeps. She has been told by her boyfriend that she snores. States that she does wake up SOB sometimes, but she also notes SOB at random sometimes. She doesn't have to be doing anything for it to happen and it goes away after a short time.    Dr. Levine: Please advise.    Universal Safety Interventions

## 2022-10-31 NOTE — ED PROVIDER NOTE - OBJECTIVE STATEMENT
33yoF previously healthy presents with fever, chills, diffuse body aches since this AM. Associated nonproductive cough. 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 Denies rashes, HA, CP, SOB, abd pain, or any other symptoms. Has not taken any meds today.
PAST SURGICAL HISTORY:  H/O abdominal hysterectomy     S/P cholecystectomy

## 2023-03-29 NOTE — ED ADULT NURSE NOTE - OBJECTIVE STATEMENT
Group Topic: BH Therapeutic Activity    Date: 3/29/2023  Start Time: 0145  End Time: 0225  Facilitators: Velia Connell    Focus: Mindfulness guided mediation youtube video with discussion on mindful activities/coping skills  Number in attendance: 11    Method: Group  Attendance: Offered, but refused   pt presents with congestion, sore throat. pt tolerating PO.

## 2023-11-12 NOTE — ED ADULT NURSE NOTE - NS ED NURSE LEVEL OF CONSCIOUSNESS ORIENTATION
Oriented - self; Oriented - place; Oriented - time Attending Only I have personally provided the amount of critical care time documented below excluding time spent on separate procedures.

## 2024-05-14 NOTE — ED ADULT NURSE NOTE - PRO INTERPRETER NEED 2
----- Message from Tejal Harvey sent at 5/14/2024  2:08 PM CDT -----  Regarding: injection request  Name of Who is Calling: Macario           What is the request in detail: Patient is requesting a call back to schedule an injection ASAP. He stated his ORTHO put in a note for the doctor to read.            Can the clinic reply by [facility]: No           What Number to Call Back if not in [facility]:  [phone]  
Staff called patient to follow up, lvm.   
English

## 2025-05-18 ENCOUNTER — EMERGENCY (EMERGENCY)
Facility: HOSPITAL | Age: 40
LOS: 0 days | Discharge: HOME | End: 2025-05-18
Attending: EMERGENCY MEDICINE
Payer: COMMERCIAL

## 2025-05-18 VITALS
WEIGHT: 293 LBS | HEART RATE: 110 BPM | OXYGEN SATURATION: 95 % | TEMPERATURE: 99 F | SYSTOLIC BLOOD PRESSURE: 145 MMHG | RESPIRATION RATE: 18 BRPM | HEIGHT: 64 IN | DIASTOLIC BLOOD PRESSURE: 94 MMHG

## 2025-05-18 DIAGNOSIS — M79.662 PAIN IN LEFT LOWER LEG: ICD-10-CM

## 2025-05-18 DIAGNOSIS — M25.562 PAIN IN LEFT KNEE: ICD-10-CM

## 2025-05-18 DIAGNOSIS — E11.9 TYPE 2 DIABETES MELLITUS WITHOUT COMPLICATIONS: ICD-10-CM
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
## 2025-05-18 DIAGNOSIS — Z91.013 ALLERGY TO SEAFOOD: ICD-10-CM
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
## 2025-05-18 PROCEDURE — 99053 MED SERV 10PM-8AM 24 HR FAC: CPT

## 2025-05-18 PROCEDURE — 73700 CT LOWER EXTREMITY W/O DYE: CPT | Mod: 26,LT

## 2025-05-18 PROCEDURE — 99284 EMERGENCY DEPT VISIT MOD MDM: CPT | Mod: 25

## 2025-05-18 PROCEDURE — 73590 X-RAY EXAM OF LOWER LEG: CPT | Mod: 26,LT

## 2025-05-18 PROCEDURE — 73700 CT LOWER EXTREMITY W/O DYE: CPT | Mod: LT

## 2025-05-18 PROCEDURE — 73590 X-RAY EXAM OF LOWER LEG: CPT | Mod: LT

## 2025-05-18 PROCEDURE — 99285 EMERGENCY DEPT VISIT HI MDM: CPT

## 2025-05-18 RX ORDER — ACETAMINOPHEN 500 MG/5ML
650 LIQUID (ML) ORAL ONCE
Refills: 0 | Status: COMPLETED | OUTPATIENT
Start: 2025-05-18 | End: 2025-05-18

## 2025-05-18 RX ADMIN — Medication 650 MILLIGRAM(S): at 02:42

## 2025-05-18 NOTE — ED PROVIDER NOTE - PATIENT PORTAL LINK FT
You can access the FollowMyHealth Patient Portal offered by [facility] by registering at the following website: [email or web address]. By joining [employer]’s FollowMyHealth portal, you will also be able to view your health information using other applications (apps) compatible with our system.
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
## 2025-05-18 NOTE — ED PROCEDURE NOTE - NS ED PERI NEURO NEG
Dr. Bautista ILEANAA Floyd Brandon Pre-application: Motor, sensory, and vascular responses intact in the injured extremity./Post-application: Motor, sensory, and vascular responses intact in the injured extremity./The patient/caregiver verbalized understanding of how to care for the injured extremity with splint
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
## 2025-05-18 NOTE — ED ADULT NURSE NOTE - NSFALLRISKINTERV_ED_ALL_ED
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
